# Patient Record
Sex: MALE
[De-identification: names, ages, dates, MRNs, and addresses within clinical notes are randomized per-mention and may not be internally consistent; named-entity substitution may affect disease eponyms.]

---

## 2019-02-07 ENCOUNTER — HOSPITAL ENCOUNTER (OUTPATIENT)
Dept: HOSPITAL 92 - RAD | Age: 29
Discharge: HOME | End: 2019-02-07
Payer: COMMERCIAL

## 2019-02-07 DIAGNOSIS — R10.13: Primary | ICD-10-CM

## 2019-02-07 PROCEDURE — 74250 X-RAY XM SM INT 1CNTRST STD: CPT

## 2019-02-07 NOTE — RAD
SMALL BOWEL STANDARD:

 

HISTORY: 

Epigastric pain, R10.13.

 

COMPARISON: 

None.

 

TECHNIQUE/FINDINGS: 

The patient was given Gastrografin contrast.  Contrast passes through the stomach, small bowel and se
en within the transverse colon within 30 minutes.

 

IMPRESSION: 

No evidence for bowel obstruction.  Contrast passes through the small bowel into the transverse colon
 within 30 minutes.

 

POS: REINA